# Patient Record
Sex: MALE | Race: OTHER | HISPANIC OR LATINO | URBAN - METROPOLITAN AREA
[De-identification: names, ages, dates, MRNs, and addresses within clinical notes are randomized per-mention and may not be internally consistent; named-entity substitution may affect disease eponyms.]

---

## 2023-01-17 ENCOUNTER — EMERGENCY (EMERGENCY)
Facility: HOSPITAL | Age: 48
LOS: 0 days | Discharge: HOME | End: 2023-01-17
Attending: EMERGENCY MEDICINE | Admitting: EMERGENCY MEDICINE
Payer: OTHER MISCELLANEOUS

## 2023-01-17 VITALS
WEIGHT: 164.02 LBS | SYSTOLIC BLOOD PRESSURE: 111 MMHG | DIASTOLIC BLOOD PRESSURE: 71 MMHG | OXYGEN SATURATION: 98 % | HEART RATE: 79 BPM | TEMPERATURE: 97 F | RESPIRATION RATE: 18 BRPM
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
## 2023-01-17 DIAGNOSIS — Z23 ENCOUNTER FOR IMMUNIZATION: ICD-10-CM
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
## 2023-01-17 DIAGNOSIS — S51.812A LACERATION WITHOUT FOREIGN BODY OF LEFT FOREARM, INITIAL ENCOUNTER: ICD-10-CM
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
## 2023-01-17 PROCEDURE — 12001 RPR S/N/AX/GEN/TRNK 2.5CM/<: CPT

## 2023-01-17 PROCEDURE — 99283 EMERGENCY DEPT VISIT LOW MDM: CPT | Mod: 25

## 2023-01-17 RX ORDER — TETANUS TOXOID, REDUCED DIPHTHERIA TOXOID AND ACELLULAR PERTUSSIS VACCINE, ADSORBED 5; 2.5; 8; 8; 2.5 [IU]/.5ML; [IU]/.5ML; UG/.5ML; UG/.5ML; UG/.5ML
0.5 SUSPENSION INTRAMUSCULAR ONCE
Refills: 0 | Status: COMPLETED | OUTPATIENT
Start: 2023-01-17 | End: 2023-01-17

## 2023-01-17 RX ADMIN — TETANUS TOXOID, REDUCED DIPHTHERIA TOXOID AND ACELLULAR PERTUSSIS VACCINE, ADSORBED 0.5 MILLILITER(S): 5; 2.5; 8; 8; 2.5 SUSPENSION INTRAMUSCULAR at 10:17

## 2023-01-17 NOTE — ED PROVIDER NOTE - PHYSICAL EXAMINATION
_  CONSTITUTIONAL: NAD  SKIN: Warm, dry; +1.5 cm laceration on the dorsal surface of the left forearm  HEAD: NCAT  EYES: Clear conjunctiva   ENT: MMM  NECK: Supple  CARD: No JVD; well perfused   RESP: Speaking in full sentences  EXT: Pulses palpable distally; LUE - no deformity, erythema, or swelling; warm, soft compartments; no bony tenderness; radial pulse 2+; full ROM; motor strength 5/5; sensation intact C5-T1  NEURO: Grossly intact  PSYCH: Cooperative, appropriate

## 2023-01-17 NOTE — ED PROVIDER NOTE - ADDITIONAL NOTES AND INSTRUCTIONS:
This patient was seen in our Emergency Department today for an urgent issue.   Please excuse them from work and/or school for today.    They may return on the date above (or earlier if feeling better) with the following restrictions: activity as tolerated; keep wound clean and dry until healed.

## 2023-01-17 NOTE — ED PROVIDER NOTE - NSFOLLOWUPINSTRUCTIONS_ED_ALL_ED_FT
Laceration    A laceration is a cut that goes through all of the layers of the skin and into the tissue that is right under the skin. Some lacerations heal on their own. Others need to be closed with skin adhesive strips, skin glue, stitches (sutures), or staples. Proper laceration care minimizes the risk of infection and helps the laceration to heal better.  If non-absorbable stitches or staples have been placed, they must be taken out within the time frame instructed by your healthcare provider.    YOU HAD 4 SUTURES PLACED; PLEASE RETURN IN 7-10 DAYS FOR WOUND CHECK AND/OR SUTURE/STAPLE REMOVAL.    How do I take care of my stitches or staples?  - Keep your stitches or staples completely dry for the first 24 hours.   - After that, you may gently wash them with soap and water whenever you take a shower. Do not soak your stitches or staples (do not put underwater) such as in a bath, pool, or lake. Getting them too wet can slow down healing and raise your chance of getting an infection.  - After you wash your stitches or staples, pat them dry.   - Avoid activities or sports that could hurt the area of your stitches or staples until it is time to remove them. If you hurt the same part of your body again, stitches can break, and the cut can open up again.    Please either return to the Emergency Department, or visit your primary care doctor, or go to a local clinic in 7-10 days for wound check and/or suture/staple removal.      SEEK IMMEDIATE MEDICAL CARE IF YOU HAVE ANY OF THE FOLLOWING SYMPTOMS: swelling around the wound, worsening pain, drainage from the wound, red streaking going away from your wound, inability to move finger or toe near the laceration, or discoloration of skin near the laceration.

## 2023-01-17 NOTE — ED PROVIDER NOTE - CLINICAL SUMMARY MEDICAL DECISION MAKING FREE TEXT BOX
47-year-old male presenting for left upper extremity laceration.  Accidental injury at work this morning.  Was working on [employer] when a knife accidentally cut the dorsal aspect of his left wrist.  No other injuries or acute complaints.  No numbness or focal weakness.  Well appearing, NAD, non toxic. NCAT PERRLA EOMI 2+ equal pulses, less than 2-second capillary fill.  1.5 cm laceration to dorsal aspect of left wrist.  Superficial.  No active bleeding.  Linear.  NVI.  Compartment soft.  Status post irrigation and repair.  Tetanus up-to-date.  Comfortable with discharge and follow-up outpatient, strict return precautions given. Endorses understanding of all of this and aware that they can return at any time for new or concerning symptoms. No further questions or concerns at this time

## 2023-01-17 NOTE — ED PROVIDER NOTE - PATIENT PORTAL LINK FT
You can access the FollowMyHealth Patient Portal offered by [facility] by registering at the following website: [email or web address]. By joining [employer]’s FollowMyHealth portal, you will also be able to view your health information using other applications (apps) compatible with our system.

## 2023-01-17 NOTE — ED PROVIDER NOTE - OBJECTIVE STATEMENT
Patient is a 47 year old man, no reported past medical history, presenting for laceration of left forearm today. Patient was at work ([employer]), when he accidentally had the knife slip and cut the dorsal surface of his left forearm. No active bleeding in the ED. No other injuries. No neurovascular deficits.   Patient is unsure if he has had a tetanus booster, however endorses having had primary series.  No other complaints - no fever/chills, rhinorrhea, sore throat, CP, palpitations, SOB, cough, abd pain, NVD, dysuria, hematuria, new joint pain, FND, rash, trauma, melena, hematochezia.
